# Patient Record
Sex: FEMALE | Race: WHITE | NOT HISPANIC OR LATINO
[De-identification: names, ages, dates, MRNs, and addresses within clinical notes are randomized per-mention and may not be internally consistent; named-entity substitution may affect disease eponyms.]

---

## 2017-02-03 ENCOUNTER — APPOINTMENT (OUTPATIENT)
Dept: CARDIOLOGY | Facility: CLINIC | Age: 82
End: 2017-02-03

## 2017-05-02 ENCOUNTER — APPOINTMENT (OUTPATIENT)
Dept: CARDIOLOGY | Facility: CLINIC | Age: 82
End: 2017-05-02

## 2017-05-03 ENCOUNTER — APPOINTMENT (OUTPATIENT)
Dept: CARDIOLOGY | Facility: CLINIC | Age: 82
End: 2017-05-03

## 2017-05-05 ENCOUNTER — APPOINTMENT (OUTPATIENT)
Dept: CARDIOLOGY | Facility: CLINIC | Age: 82
End: 2017-05-05

## 2017-05-19 ENCOUNTER — APPOINTMENT (OUTPATIENT)
Dept: CARDIOLOGY | Facility: CLINIC | Age: 82
End: 2017-05-19

## 2017-05-23 ENCOUNTER — APPOINTMENT (OUTPATIENT)
Dept: CARDIOLOGY | Facility: CLINIC | Age: 82
End: 2017-05-23

## 2017-06-06 ENCOUNTER — APPOINTMENT (OUTPATIENT)
Dept: CARDIOLOGY | Facility: CLINIC | Age: 82
End: 2017-06-06

## 2017-06-20 ENCOUNTER — APPOINTMENT (OUTPATIENT)
Dept: CARDIOLOGY | Facility: CLINIC | Age: 82
End: 2017-06-20

## 2017-06-30 ENCOUNTER — TRANSCRIPTION ENCOUNTER (OUTPATIENT)
Age: 82
End: 2017-06-30

## 2017-06-30 ENCOUNTER — APPOINTMENT (OUTPATIENT)
Dept: CARDIOLOGY | Facility: CLINIC | Age: 82
End: 2017-06-30

## 2017-09-05 ENCOUNTER — APPOINTMENT (OUTPATIENT)
Dept: CARDIOLOGY | Facility: CLINIC | Age: 82
End: 2017-09-05
Payer: MEDICARE

## 2017-09-05 PROCEDURE — 99214 OFFICE O/P EST MOD 30 MIN: CPT

## 2017-11-07 ENCOUNTER — APPOINTMENT (OUTPATIENT)
Age: 82
End: 2017-11-07
Payer: MEDICARE

## 2017-11-07 PROCEDURE — 99214 OFFICE O/P EST MOD 30 MIN: CPT

## 2017-12-28 ENCOUNTER — RECORD ABSTRACTING (OUTPATIENT)
Age: 82
End: 2017-12-28

## 2017-12-29 ENCOUNTER — MEDICATION RENEWAL (OUTPATIENT)
Age: 82
End: 2017-12-29

## 2017-12-29 RX ORDER — APIXABAN 5 MG/1
5 TABLET, FILM COATED ORAL
Qty: 180 | Refills: 1 | Status: ACTIVE | COMMUNITY
Start: 1900-01-01 | End: 1900-01-01

## 2018-01-02 ENCOUNTER — APPOINTMENT (OUTPATIENT)
Dept: CARDIOLOGY | Facility: CLINIC | Age: 83
End: 2018-01-02
Payer: MEDICARE

## 2018-01-02 VITALS
WEIGHT: 160 LBS | OXYGEN SATURATION: 95 % | HEART RATE: 82 BPM | DIASTOLIC BLOOD PRESSURE: 70 MMHG | BODY MASS INDEX: 29.44 KG/M2 | HEIGHT: 62 IN | SYSTOLIC BLOOD PRESSURE: 110 MMHG

## 2018-01-02 DIAGNOSIS — Z83.1 FAMILY HISTORY OF OTHER INFECTIOUS AND PARASITIC DISEASES: ICD-10-CM

## 2018-01-02 DIAGNOSIS — Z82.49 FAMILY HISTORY OF ISCHEMIC HEART DISEASE AND OTHER DISEASES OF THE CIRCULATORY SYSTEM: ICD-10-CM

## 2018-01-02 PROCEDURE — 99214 OFFICE O/P EST MOD 30 MIN: CPT

## 2018-01-02 RX ORDER — ZOLPIDEM TARTRATE 10 MG/1
10 TABLET, FILM COATED ORAL
Refills: 0 | Status: ACTIVE | COMMUNITY

## 2018-01-02 RX ORDER — ROSUVASTATIN CALCIUM 5 MG/1
5 TABLET, FILM COATED ORAL
Refills: 0 | Status: ACTIVE | COMMUNITY

## 2018-02-16 ENCOUNTER — MEDICATION RENEWAL (OUTPATIENT)
Age: 83
End: 2018-02-16

## 2018-02-21 RX ORDER — ALPRAZOLAM 0.5 MG/1
0.5 TABLET ORAL
Qty: 60 | Refills: 0 | Status: ACTIVE | COMMUNITY
Start: 2018-02-21 | End: 1900-01-01

## 2018-05-10 ENCOUNTER — APPOINTMENT (OUTPATIENT)
Dept: CARDIOLOGY | Facility: CLINIC | Age: 83
End: 2018-05-10
Payer: MEDICARE

## 2018-05-10 PROCEDURE — 93880 EXTRACRANIAL BILAT STUDY: CPT

## 2018-05-10 PROCEDURE — 93306 TTE W/DOPPLER COMPLETE: CPT

## 2018-05-21 ENCOUNTER — RECORD ABSTRACTING (OUTPATIENT)
Age: 83
End: 2018-05-21

## 2018-05-22 ENCOUNTER — APPOINTMENT (OUTPATIENT)
Dept: CARDIOLOGY | Facility: CLINIC | Age: 83
End: 2018-05-22
Payer: MEDICARE

## 2018-05-22 VITALS
OXYGEN SATURATION: 95 % | HEART RATE: 76 BPM | BODY MASS INDEX: 29.44 KG/M2 | SYSTOLIC BLOOD PRESSURE: 110 MMHG | WEIGHT: 160 LBS | HEIGHT: 62 IN | DIASTOLIC BLOOD PRESSURE: 70 MMHG

## 2018-05-22 PROCEDURE — 99214 OFFICE O/P EST MOD 30 MIN: CPT

## 2018-06-25 ENCOUNTER — OUTPATIENT (OUTPATIENT)
Dept: OUTPATIENT SERVICES | Facility: HOSPITAL | Age: 83
LOS: 1 days | End: 2018-06-25

## 2018-09-25 ENCOUNTER — OTHER (OUTPATIENT)
Age: 83
End: 2018-09-25

## 2018-10-11 ENCOUNTER — RECORD ABSTRACTING (OUTPATIENT)
Age: 83
End: 2018-10-11

## 2018-10-12 ENCOUNTER — RECORD ABSTRACTING (OUTPATIENT)
Age: 83
End: 2018-10-12

## 2018-10-16 ENCOUNTER — APPOINTMENT (OUTPATIENT)
Dept: CARDIOLOGY | Facility: CLINIC | Age: 83
End: 2018-10-16
Payer: MEDICARE

## 2018-10-16 VITALS
HEIGHT: 62 IN | DIASTOLIC BLOOD PRESSURE: 60 MMHG | WEIGHT: 160 LBS | BODY MASS INDEX: 29.44 KG/M2 | OXYGEN SATURATION: 97 % | SYSTOLIC BLOOD PRESSURE: 110 MMHG | HEART RATE: 85 BPM

## 2018-10-16 PROCEDURE — 99214 OFFICE O/P EST MOD 30 MIN: CPT

## 2018-10-16 PROCEDURE — 93000 ELECTROCARDIOGRAM COMPLETE: CPT

## 2019-03-12 ENCOUNTER — RECORD ABSTRACTING (OUTPATIENT)
Age: 84
End: 2019-03-12

## 2019-03-19 ENCOUNTER — APPOINTMENT (OUTPATIENT)
Dept: CARDIOLOGY | Facility: CLINIC | Age: 84
End: 2019-03-19
Payer: MEDICARE

## 2019-03-19 VITALS
HEART RATE: 80 BPM | HEIGHT: 62 IN | OXYGEN SATURATION: 95 % | BODY MASS INDEX: 30.36 KG/M2 | SYSTOLIC BLOOD PRESSURE: 120 MMHG | WEIGHT: 165 LBS | DIASTOLIC BLOOD PRESSURE: 70 MMHG

## 2019-03-19 PROCEDURE — 99214 OFFICE O/P EST MOD 30 MIN: CPT

## 2019-05-22 ENCOUNTER — APPOINTMENT (OUTPATIENT)
Dept: CARDIOLOGY | Facility: CLINIC | Age: 84
End: 2019-05-22
Payer: MEDICARE

## 2019-05-22 PROCEDURE — 93306 TTE W/DOPPLER COMPLETE: CPT

## 2019-05-22 PROCEDURE — 93880 EXTRACRANIAL BILAT STUDY: CPT

## 2019-05-24 ENCOUNTER — OUTPATIENT (OUTPATIENT)
Dept: OUTPATIENT SERVICES | Facility: HOSPITAL | Age: 84
LOS: 1 days | End: 2019-05-24

## 2019-06-06 ENCOUNTER — APPOINTMENT (OUTPATIENT)
Dept: CARDIOLOGY | Facility: CLINIC | Age: 84
End: 2019-06-06
Payer: MEDICARE

## 2019-06-06 VITALS
SYSTOLIC BLOOD PRESSURE: 140 MMHG | OXYGEN SATURATION: 96 % | HEART RATE: 78 BPM | WEIGHT: 160 LBS | DIASTOLIC BLOOD PRESSURE: 68 MMHG | HEIGHT: 62 IN | BODY MASS INDEX: 29.44 KG/M2

## 2019-06-06 DIAGNOSIS — F41.9 ANXIETY DISORDER, UNSPECIFIED: ICD-10-CM

## 2019-06-06 DIAGNOSIS — K21.9 GASTRO-ESOPHAGEAL REFLUX DISEASE W/OUT ESOPHAGITIS: ICD-10-CM

## 2019-06-06 PROCEDURE — 99214 OFFICE O/P EST MOD 30 MIN: CPT

## 2019-06-06 NOTE — PHYSICAL EXAM
[Normal Appearance] : normal appearance [Eyelids - No Xanthelasma] : the eyelids demonstrated no xanthelasmas [Normal Jugular Venous V Waves Present] : normal jugular venous V waves present [No Oral Pallor] : no oral pallor [Respiration, Rhythm And Depth] : normal respiratory rhythm and effort [Exaggerated Use Of Accessory Muscles For Inspiration] : no accessory muscle use [Heart Rate And Rhythm] : heart rate and rhythm were normal [Auscultation Breath Sounds / Voice Sounds] : lungs were clear to auscultation bilaterally [Heart Sounds] : normal S1 and S2 [Bowel Sounds] : normal bowel sounds [Abdomen Soft] : soft [Abdomen Tenderness] : non-tender [Petechial Hemorrhages (___cm)] : no petechial hemorrhages [Abnormal Walk] : normal gait [] : no rash [Oriented To Time, Place, And Person] : oriented to person, place, and time

## 2019-06-06 NOTE — HISTORY OF PRESENT ILLNESS
[FreeTextEntry1] : John is a pleasant 84-jorge-old female:\par \par She has no shortness of breath, minimal pedal edema towards end of the day.  She had TAVR.  \par She has nonobstructive carotid disease. \par She has history of atrial fibrillation.  We will make sure there is no decrease in heart rate, filiberto or tachyarrhythmias.  \par She does not want to have any exercise test \par The anxiousness has been more since the demise of the .  \par She will call us if she has any new symptoms following the testing.\par \par She had TAVR at [facility] on May 3, 2017, serial #[id number], model #[id number] aortic on June 7, 2017.  \par Her GERD symptoms are stable.  \par Blood pressure is stable.  \par She is compliant with her medications.  \par

## 2019-06-06 NOTE — DISCUSSION/SUMMARY
[FreeTextEntry1] : Discussed her blood pressure, labs, carotid ultrasound and echocardiogram results.\par \par Continue current medications, no change. Due to slight increase in her serum creatinine, Lasix had been held as per Dr. Ronquillo. Followup labs are pending with him.\par \par She has history of atrial fibrillation.  No recent palpitations, TIA, CVA or syncope. She is tolerating Eliquis\par \par She does not want to have any exercise test or any testings.  She is comfortable with conservative management.  She does not want aggressive measures if she gets sick.  \par \par \par

## 2019-06-06 NOTE — ASSESSMENT
[FreeTextEntry1] : Reviewed Today:\par - Carotid  ultrasound June 2019: Moderate atherosclerosis in the right bulb, no significant stenosis. Mild atherosclerosis in the left.\par - Echocardiogram May 2019: Normal left ventricular ejection fraction. Bioprosthetic aortic valve. Mean aortic valve gradient 23. PASP 35. Severe dilated left atrium.\par - Labs May 2019: Normal TSH. Serum creatinine 1.3. Normal LFT,  Potassium 4.4. LDL 42. A1c 7.1.

## 2019-09-24 ENCOUNTER — OUTPATIENT (OUTPATIENT)
Dept: OUTPATIENT SERVICES | Facility: HOSPITAL | Age: 84
LOS: 1 days | End: 2019-09-24

## 2019-10-23 ENCOUNTER — NON-APPOINTMENT (OUTPATIENT)
Age: 84
End: 2019-10-23

## 2019-10-23 ENCOUNTER — APPOINTMENT (OUTPATIENT)
Dept: CARDIOLOGY | Facility: CLINIC | Age: 84
End: 2019-10-23
Payer: MEDICARE

## 2019-10-23 VITALS
SYSTOLIC BLOOD PRESSURE: 122 MMHG | OXYGEN SATURATION: 95 % | WEIGHT: 160 LBS | HEIGHT: 62 IN | DIASTOLIC BLOOD PRESSURE: 70 MMHG | BODY MASS INDEX: 29.44 KG/M2 | HEART RATE: 82 BPM

## 2019-10-23 DIAGNOSIS — E11.9 TYPE 2 DIABETES MELLITUS W/OUT COMPLICATIONS: ICD-10-CM

## 2019-10-23 PROCEDURE — 99214 OFFICE O/P EST MOD 30 MIN: CPT

## 2019-10-23 PROCEDURE — 93000 ELECTROCARDIOGRAM COMPLETE: CPT

## 2019-10-23 RX ORDER — LEVOTHYROXINE SODIUM 25 UG/1
25 TABLET ORAL DAILY
Refills: 0 | Status: DISCONTINUED | COMMUNITY
End: 2019-10-23

## 2019-10-23 RX ORDER — LEVOTHYROXINE SODIUM 50 UG/1
50 TABLET ORAL DAILY
Refills: 0 | Status: ACTIVE | COMMUNITY

## 2019-10-23 NOTE — REASON FOR VISIT
[Follow-Up - Clinic] : a clinic follow-up of [Anticoagulation] : anticoagulation [Aortic Stenosis] : aortic stenosis [Atrial Fibrillation] : atrial fibrillation [Carotid Artery Stenosis] : carotid stenosis [Coronary Artery Disease] : coronary artery disease [Hyperlipidemia] : hyperlipidemia [Medication Management] : Medication management [Mitral Regurgitation] : mitral regurgitation [Prosthetic Valve] : a prosthetic valve

## 2019-10-23 NOTE — REVIEW OF SYSTEMS
[Eyeglasses] : currently wearing eyeglasses [Joint Pain] : joint pain [Negative] : Endocrine [see HPI] : see HPI

## 2019-10-23 NOTE — PHYSICAL EXAM
[Normal Appearance] : normal appearance [Eyelids - No Xanthelasma] : the eyelids demonstrated no xanthelasmas [No Oral Pallor] : no oral pallor [Normal Jugular Venous V Waves Present] : normal jugular venous V waves present [Respiration, Rhythm And Depth] : normal respiratory rhythm and effort [Exaggerated Use Of Accessory Muscles For Inspiration] : no accessory muscle use [Heart Rate And Rhythm] : heart rate and rhythm were normal [Auscultation Breath Sounds / Voice Sounds] : lungs were clear to auscultation bilaterally [Heart Sounds] : normal S1 and S2 [Bowel Sounds] : normal bowel sounds [Abdomen Soft] : soft [Abdomen Tenderness] : non-tender [Abnormal Walk] : normal gait [Petechial Hemorrhages (___cm)] : no petechial hemorrhages [] : no rash [Oriented To Time, Place, And Person] : oriented to person, place, and time

## 2019-10-24 NOTE — ASSESSMENT
[FreeTextEntry1] : Valvular heart disease status post TAVR, elevated gradient, mild paravalular, nl EF\par CAD\par Atherosclerosis\par Atrial fibrillation\par Dyslipidemia\par Diabetes \par Mild RI\par \par Presently asymptomatic.\par \par Monitor prosthetic gradient and paravalvular regurgitation.\par Monitor carotid disease.\par \par Continue carvedilol. Heart rate blood pressure well-controlled. \par Continue diuretic therapy. Low sodium diet. Monitor electrolytes and renal function. \par Continue statin therapy. \par Continue angiotensin receptor blocker\par Continue anticoagulation. Monitor hemoglobin and renal function. \par Monitor thyroid replacement. \par Diabetes control with primary physician. \par SBE prophylaxis\par Regular aerobic activity.

## 2019-10-24 NOTE — HISTORY OF PRESENT ILLNESS
[FreeTextEntry1] : VERONIKA WALDRON  is a 84 year old  F\par \par History of valvular heart disease status post TAVR, CAD, atherosclerosis, atrial fibrillation, dyslipidemia, diabetes, CRI \par There is no prior history of a clinical myocardial infarction, coronary revascularization. \par \par Long-standing history of heart murmur. \par Prior symptoms of edema and shortness of breath.\par \par  She is active at home and on the treadmill.\par There is no exertional chest pain, pressure or discomfort. \par There is no significant dyspnea on exertion or orthopnea. \par There are no symptomatic palpitations, lightheadedness, dizziness or syncope.\par \par Carotid Doppler May 2019. Mild to moderate atherosclerosis \par Echocardiogram May 2019 Normal left ventricular function. Mild mitral regurgitation bioprosthetic aortic valve mean gradient 23. Mild paravalvular aortic insufficiency left atrial enlargement  \par Cardiac catheterization 2017, normal left ventricular function and severe aortic stenosis, nonobstructive left circumflex disease\par EKG demonstrates atrial fibrillation with nonspecific ST changes. \par Last blood work. 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

## 2020-01-24 ENCOUNTER — OUTPATIENT (OUTPATIENT)
Dept: OUTPATIENT SERVICES | Facility: HOSPITAL | Age: 85
LOS: 1 days | End: 2020-01-24

## 2020-05-19 ENCOUNTER — OUTPATIENT (OUTPATIENT)
Dept: OUTPATIENT SERVICES | Facility: HOSPITAL | Age: 85
LOS: 1 days | End: 2020-05-19

## 2020-08-26 ENCOUNTER — APPOINTMENT (OUTPATIENT)
Dept: CARDIOLOGY | Facility: CLINIC | Age: 85
End: 2020-08-26

## 2020-08-27 ENCOUNTER — EMERGENCY (EMERGENCY)
Facility: HOSPITAL | Age: 85
LOS: 1 days | End: 2020-08-27
Admitting: EMERGENCY MEDICINE
Payer: MEDICARE

## 2020-08-27 PROCEDURE — 99284 EMERGENCY DEPT VISIT MOD MDM: CPT

## 2020-08-27 PROCEDURE — 74177 CT ABD & PELVIS W/CONTRAST: CPT | Mod: 26

## 2020-12-10 ENCOUNTER — OUTPATIENT (OUTPATIENT)
Dept: OUTPATIENT SERVICES | Facility: HOSPITAL | Age: 85
LOS: 1 days | End: 2020-12-10

## 2021-04-22 ENCOUNTER — OUTPATIENT (OUTPATIENT)
Dept: OUTPATIENT SERVICES | Facility: HOSPITAL | Age: 86
LOS: 1 days | End: 2021-04-22

## 2021-05-10 ENCOUNTER — RESULT CHARGE (OUTPATIENT)
Age: 86
End: 2021-05-10

## 2021-05-11 ENCOUNTER — APPOINTMENT (OUTPATIENT)
Dept: CARDIOLOGY | Facility: CLINIC | Age: 86
End: 2021-05-11
Payer: MEDICARE

## 2021-05-11 ENCOUNTER — NON-APPOINTMENT (OUTPATIENT)
Age: 86
End: 2021-05-11

## 2021-05-11 VITALS
TEMPERATURE: 98 F | WEIGHT: 158 LBS | HEART RATE: 70 BPM | DIASTOLIC BLOOD PRESSURE: 86 MMHG | OXYGEN SATURATION: 98 % | SYSTOLIC BLOOD PRESSURE: 160 MMHG | BODY MASS INDEX: 29.08 KG/M2 | HEIGHT: 62 IN

## 2021-05-11 PROCEDURE — 99215 OFFICE O/P EST HI 40 MIN: CPT

## 2021-05-11 PROCEDURE — 93000 ELECTROCARDIOGRAM COMPLETE: CPT

## 2021-05-11 RX ORDER — AMLODIPINE BESYLATE 2.5 MG/1
2.5 TABLET ORAL
Qty: 90 | Refills: 0 | Status: ACTIVE | COMMUNITY
Start: 2020-09-10

## 2021-05-11 RX ORDER — OXYCODONE AND ACETAMINOPHEN 5; 325 MG/1; MG/1
5-325 TABLET ORAL
Refills: 0 | Status: DISCONTINUED | COMMUNITY
End: 2021-05-11

## 2021-05-11 RX ORDER — LOSARTAN POTASSIUM 100 MG/1
100 TABLET, FILM COATED ORAL
Qty: 90 | Refills: 0 | Status: ACTIVE | COMMUNITY
Start: 2021-02-18

## 2021-05-11 RX ORDER — DICYCLOMINE HYDROCHLORIDE 20 MG/1
20 TABLET ORAL
Qty: 120 | Refills: 0 | Status: ACTIVE | COMMUNITY
Start: 2021-03-18

## 2021-05-11 RX ORDER — OMEPRAZOLE 20 MG/1
20 CAPSULE, DELAYED RELEASE ORAL
Qty: 90 | Refills: 0 | Status: ACTIVE | COMMUNITY
Start: 2020-08-24

## 2021-05-11 NOTE — HISTORY OF PRESENT ILLNESS
[FreeTextEntry1] : VERONIKA WALDRON  is a 86 year old  F here today for routine cardiovascular followup, last seen Oct 2019. \par \par History of valvular heart disease status post TAVR may 2017 at [place], nonobx CAD (cath '17 50% LCx), atherosclerosis, atrial fibrillation, dyslipidemia, diabetes, CRI \par There is no prior history of a clinical myocardial infarction, coronary revascularization, cerebrovascular events. \par \par Long-standing history of heart murmur. \par Prior symptoms of edema and shortness of breath which brought her into [facility] for [facility] procedure.\par Has remained very stable from cardiac standpoint since then. \par States she was in ED for stomach problems but was not admitted. \par \par She is active at home and on the treadmill.\par There is no exertional chest pain, pressure or discomfort. \par There is no significant dyspnea on exertion or orthopnea. \par There are no symptomatic palpitations, lightheadedness, dizziness or syncope.\par There is no abnormal bleeding on OAC. \par \par EKG today 5/11/21 AF with nonspecific ST-T wave abnormalities, no sign. change from prior.\par \par Labs 4/22/21 hgb 11, k 3.8, creat 1.1, LDL 48, HDL 46, A1c 6.6\par \par Carotid Doppler May 2019. Mild to moderate atherosclerosis \par \par Echocardiogram May 2019 Normal left ventricular function. Mild mitral regurgitation bioprosthetic aortic valve mean gradient 23. 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

## 2021-05-11 NOTE — PHYSICAL EXAM
[No Edema] : no edema [Normal] : alert and oriented, normal memory [de-identified] : BL carotid bruit [de-identified] : 2/5 ANDREIA parasternal border, irreg irreg

## 2021-05-11 NOTE — ASSESSMENT
[FreeTextEntry1] : VERONIKA WALDRON is a 86 year old F who presents today May 11, 2021 in routine cardiovascular followup. \par \par Valvular heart disease status post TAVR 2017, elevated gradient, mild paravalular, nl EF\par No evidence of HF on exam today. Recommend repeat echo for surveillance of aortic gradients. \par SBE prophylaxis\par \par CAD, nonobsx cath 2017\par No angina at present, limited functional status. \par Echo for LVEF and consider ischemic eval based on these results. \par \par HL\par Atherosclerosis, moderate\par Abd US for atherosclerotic disease and r/o AAA\par Obtain carotid ultrasound to evaluate for progressive carotid atherosclerosis or obstruction. \par Cont statin rx. Lipids are well controlled on low dose. \par \par Atrial fibrillation\par 3 day monitor to evaluate for rate control and r/o underling conduction disease given TAVR hx. \par Continue OAC, monitor hgb and renal fns. Reviewed bleeding precautions. \par \par Diabetes, managed closely with PCP. Last A1c 6.6\par Mild RI, stable. Cont to monitor. \par \par HTN, elevated on exam. Increase coreg to 12.5mg BID. Cont benicar 40 and lasix 20. Monitor renal fns and lects. Low salt diet and regular physical activity. \par \par Monitor thyroid replacement. \par \par F/U after testing to review results.\par Any questions and concerns were addressed and resolved. \par \par Sincerely,\par \par LIZBETH Yan\par Patients history, testing, and plan reviewed with supervising MD: Dr. Alexis Banerjee

## 2021-05-11 NOTE — ADDENDUM
[FreeTextEntry1] : Please note the patient was seen and examined with NP Lolis Cespedes\par I was physically present during the service of the patient and personally examined the patient. \jaylen I was directly involved in the management plan and recommendations of the care provided to the patient. \jaylen I personally reviewed the history and physical examination as documented by the NP above.\par 05/11/2021\par

## 2021-05-11 NOTE — REASON FOR VISIT
[Arrhythmia/ECG Abnorrmalities] : arrhythmia/ECG abnormalities [Structural Heart and Valve Disease] : structural heart and valve disease [Hyperlipidemia] : hyperlipidemia [Hypertension] : hypertension

## 2021-05-27 ENCOUNTER — APPOINTMENT (OUTPATIENT)
Dept: CARDIOLOGY | Facility: CLINIC | Age: 86
End: 2021-05-27
Payer: MEDICARE

## 2021-05-27 PROCEDURE — 93306 TTE W/DOPPLER COMPLETE: CPT

## 2021-05-27 PROCEDURE — 93979 VASCULAR STUDY: CPT

## 2021-05-27 PROCEDURE — 93880 EXTRACRANIAL BILAT STUDY: CPT

## 2021-06-01 ENCOUNTER — APPOINTMENT (OUTPATIENT)
Dept: CARDIOLOGY | Facility: CLINIC | Age: 86
End: 2021-06-01

## 2021-06-15 ENCOUNTER — NON-APPOINTMENT (OUTPATIENT)
Age: 86
End: 2021-06-15

## 2021-06-15 ENCOUNTER — APPOINTMENT (OUTPATIENT)
Dept: CARDIOLOGY | Facility: CLINIC | Age: 86
End: 2021-06-15
Payer: MEDICARE

## 2021-06-15 VITALS
HEART RATE: 78 BPM | BODY MASS INDEX: 27.6 KG/M2 | TEMPERATURE: 97.7 F | SYSTOLIC BLOOD PRESSURE: 110 MMHG | DIASTOLIC BLOOD PRESSURE: 80 MMHG | WEIGHT: 150 LBS | HEIGHT: 62 IN | OXYGEN SATURATION: 96 %

## 2021-06-15 DIAGNOSIS — E78.2 MIXED HYPERLIPIDEMIA: ICD-10-CM

## 2021-06-15 DIAGNOSIS — I65.29 OCCLUSION AND STENOSIS OF UNSPECIFIED CAROTID ARTERY: ICD-10-CM

## 2021-06-15 DIAGNOSIS — I10 ESSENTIAL (PRIMARY) HYPERTENSION: ICD-10-CM

## 2021-06-15 PROCEDURE — 99214 OFFICE O/P EST MOD 30 MIN: CPT

## 2021-06-15 RX ORDER — CARVEDILOL 6.25 MG/1
6.25 TABLET, FILM COATED ORAL
Qty: 180 | Refills: 0 | Status: DISCONTINUED | COMMUNITY
Start: 2020-08-24 | End: 2021-06-15

## 2021-06-15 RX ORDER — CARVEDILOL 12.5 MG/1
12.5 TABLET, FILM COATED ORAL
Refills: 0 | Status: DISCONTINUED | COMMUNITY
End: 2021-06-15

## 2021-06-15 RX ORDER — HYDROCODONE BITARTRATE AND ACETAMINOPHEN 5; 325 MG/1; MG/1
5-325 TABLET ORAL 3 TIMES DAILY
Refills: 0 | Status: ACTIVE | COMMUNITY
Start: 2021-04-15

## 2021-06-15 RX ORDER — CYCLOBENZAPRINE HYDROCHLORIDE 10 MG/1
10 TABLET, FILM COATED ORAL
Refills: 0 | Status: ACTIVE | COMMUNITY

## 2021-06-15 RX ORDER — CARVEDILOL 6.25 MG/1
6.25 TABLET, FILM COATED ORAL
Qty: 30 | Refills: 0 | Status: ACTIVE | COMMUNITY
Start: 2021-06-15

## 2021-06-15 RX ORDER — METFORMIN HYDROCHLORIDE 500 MG/1
500 TABLET, COATED ORAL
Refills: 0 | Status: DISCONTINUED | COMMUNITY
End: 2021-06-15

## 2021-06-15 RX ORDER — HYDROCODONE BITARTRATE AND ACETAMINOPHEN 5; 325 MG/1; MG/1
5-325 TABLET ORAL
Refills: 0 | Status: DISCONTINUED | COMMUNITY
End: 2021-06-15

## 2021-06-15 RX ORDER — FUROSEMIDE 20 MG/1
20 TABLET ORAL DAILY
Refills: 0 | Status: DISCONTINUED | COMMUNITY
End: 2021-06-15

## 2021-06-15 NOTE — CARDIOLOGY SUMMARY
[de-identified] : 5/11/21 AF with nonspecific ST-T wave abnormalities, no sign. change from prior. [de-identified] : declines [de-identified] : 5/27/21: Normal left ventricular function. Mild mitral regurgitation/stenosis,  bioprosthetic aortic valve mean gradient 22mmHg unchanged. Mild paravalvular aortic insufficiency, left atrial enlargement. [de-identified] : Cardiac catheterization 2017, normal left ventricular function and severe aortic stenosis, nonobstructive left circumflex disease [de-identified] : 5/27/21:\par Abd mild atherosclerosis of aortia, neg for AAA\par Carotid mild nonobstructive atherosclerosis  [de-identified] : Labs 4/22/21 hgb 11, k 3.8, creat 1.1, LDL 48, HDL 46, A1c 6.6

## 2021-06-15 NOTE — ASSESSMENT
[FreeTextEntry1] : VERONKIA WALDRON is a 86 year old F who presents today Enrique 15, 2021 here to review cardiovascular test results. Overall maintains good QOL and outlook. Asymptomatic from cardiac standpoint. \par \par Valvular heart disease status post TAVR 2017, elevated gradient, mild paravalular, nl EF\par No evidence of HF on exam today. Mean aortic gradients are unchanged from prior study.\par Continue surveillance monitoring.  \par SBE prophylaxis\par \par CAD, nonobsx cath 2017\par No angina at present, limited functional status. \par Echo shows normal LV systolic function. Pt prefers conservative management. Will defer stress testing. \par Cont preventative medical therapy including statin and diet/lifestyle changes. \par \par HL\par Atherosclerosis, moderate\par No AAA\par Mild abd/carotid atherosclerosis, nonobstructive\par Cont statin rx. Lipids are well controlled on low dose. \par \par Atrial fibrillation\par Recommended monitoring to r/o underling conduction disease given TAVR hx. \par Pt declines as she is asx from arrhythmia standpoint. No hx of underling AVB. \par Continue OAC and BB, monitor hgb and renal fns. Reviewed bleeding precautions. \par She'll call me right away if any dizziness or palps occur. \par \par Diabetes, managed closely with PCP. Last A1c 6.6\par Mild RI, stable. Cont to monitor. \par \par HTN, improved with dietary changes to reduce salt. Cont current dose of carvedilol, we'll call her to clarify. Cont benicar 40 and lasix 20. Monitor renal fns and lects. Low salt diet and regular physical activity. \par \par Monitor thyroid replacement. \par \par F/U 6 mo with Dr. Banerjee. \par Any questions and concerns were addressed and resolved. \par \par Sincerely,\par \par LIZBETH Yan\par Patients history, testing, and plan reviewed with supervising MD: Dr. Alexis Banerjee

## 2021-06-15 NOTE — ADDENDUM
[FreeTextEntry1] : Please note the patient was reviewed with NP Lolis Cespedes.\par I was physically present during the service of the patient.\par I was directly involved in the management plan and recommendations of the care provided to the patient. \par I personally reviewed the history and physical examination as documented by the NP above.\par Enrique 15 2021  2:00PM\par

## 2021-06-15 NOTE — HISTORY OF PRESENT ILLNESS
[FreeTextEntry1] : VERONIKA WALDRON  is a 86 year old  F here today here to review cardiovascular test results. \par \par History of valvular heart disease status post TAVR may 2017 at [place], nonobx CAD (cath '17 50% LCx), atherosclerosis, atrial fibrillation, dyslipidemia, diabetes, CRI \par There is no prior history of a clinical myocardial infarction, coronary revascularization, cerebrovascular events. \par \par Long-standing history of heart murmur. \par Prior symptoms of edema and shortness of breath which brought her into [facility] for [facility] procedure.\par Has remained very stable from cardiac standpoint since then. \par States she was in ED for stomach problems but was not admitted. \par \par She maintains good QOL, lives alone with help from friends. \par She is active at home and on the treadmill.\par There is no exertional chest pain, pressure or discomfort. \par There is no significant dyspnea on exertion or orthopnea. \par There are no symptomatic palpitations, lightheadedness, dizziness or syncope.\par There is no abnormal bleeding on OAC. \par \par I saw her in office last month and her BP was elevated. Today much improved. She realized she was eating too many cheetos. She did not make any med changes. 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

## 2021-06-15 NOTE — PHYSICAL EXAM
[No Edema] : no edema [Normal] : alert and oriented, normal memory [de-identified] : BL carotid bruit [de-identified] : 2/5 ANDREIA parasternal border, irreg irreg

## 2021-08-05 ENCOUNTER — OUTPATIENT (OUTPATIENT)
Dept: OUTPATIENT SERVICES | Facility: HOSPITAL | Age: 86
LOS: 1 days | End: 2021-08-05

## 2021-08-05 ENCOUNTER — INPATIENT (INPATIENT)
Facility: HOSPITAL | Age: 86
LOS: 1 days | Discharge: HOME | End: 2021-08-07
Admitting: INTERNAL MEDICINE
Payer: MEDICARE

## 2021-08-05 PROCEDURE — 93010 ELECTROCARDIOGRAM REPORT: CPT

## 2021-08-05 PROCEDURE — 71045 X-RAY EXAM CHEST 1 VIEW: CPT | Mod: 26

## 2021-08-05 PROCEDURE — 99285 EMERGENCY DEPT VISIT HI MDM: CPT | Mod: CS

## 2021-08-05 PROCEDURE — 70491 CT SOFT TISSUE NECK W/DYE: CPT | Mod: 26

## 2021-08-06 ENCOUNTER — OUTPATIENT (OUTPATIENT)
Dept: OUTPATIENT SERVICES | Facility: HOSPITAL | Age: 86
LOS: 1 days | End: 2021-08-06

## 2021-08-07 ENCOUNTER — OUTPATIENT (OUTPATIENT)
Dept: OUTPATIENT SERVICES | Facility: HOSPITAL | Age: 86
LOS: 1 days | End: 2021-08-07

## 2021-12-07 ENCOUNTER — APPOINTMENT (OUTPATIENT)
Dept: CARDIOLOGY | Facility: CLINIC | Age: 86
End: 2021-12-07
Payer: MEDICARE

## 2021-12-07 VITALS
SYSTOLIC BLOOD PRESSURE: 136 MMHG | HEIGHT: 62 IN | HEART RATE: 95 BPM | TEMPERATURE: 98 F | DIASTOLIC BLOOD PRESSURE: 70 MMHG | WEIGHT: 150 LBS | OXYGEN SATURATION: 96 % | BODY MASS INDEX: 27.6 KG/M2

## 2021-12-07 PROCEDURE — 99214 OFFICE O/P EST MOD 30 MIN: CPT

## 2021-12-11 NOTE — ASSESSMENT
[FreeTextEntry1] : Valvular heart disease status post TAVR 2017, elevated gradient, mild paravalular, nl EF\par f/u echo\par SBE prophylaxis\par \par CAD, nonobsx cath 2017\par No angina at present, limited functional status. \par Pt prefers conservative management. Will defer stress testing. \par Cont preventative medical therapy including statin and diet/lifestyle changes. \par \par HL\par Atherosclerosis, moderate\par No AAA\par Mild abd/carotid atherosclerosis, nonobstructive\par Continue statin therapy \par \par Atrial fibrillation\par Recommended monitoring to r/o underling conduction disease given TAVR hx. \par Pt declines as she is asx from arrhythmia standpoint. No hx of underling AVB. \par Continue OAC and BB, monitor hgb and renal fns. \par Reviewed bleeding precautions. \par \par HTN, improved \par Adjunctive lifestyle measures advised \par Low-sodium diet \par Monitor thyroid replacement. \par \par Declines further testing \par Requesting follow up blood work and echo \par \par Discussed red flag symptoms that would warrant immediate intervention. All patient questions and concerns have been addressed. Instructed to call the office if any new symptoms. \par \par Encounter documented by Alexandria Valadez on 12/07/2021 acting as a scribe for Dr. Alexis Banerjee MD [facility]

## 2022-01-14 ENCOUNTER — OUTPATIENT (OUTPATIENT)
Dept: OUTPATIENT SERVICES | Facility: HOSPITAL | Age: 87
LOS: 1 days | End: 2022-01-14

## 2022-01-27 DIAGNOSIS — E03.9 HYPOTHYROIDISM, UNSPECIFIED: ICD-10-CM

## 2022-05-05 ENCOUNTER — OUTPATIENT (OUTPATIENT)
Dept: OUTPATIENT SERVICES | Facility: HOSPITAL | Age: 87
LOS: 1 days | End: 2022-05-05

## 2022-05-05 DIAGNOSIS — R14.0 ABDOMINAL DISTENSION (GASEOUS): ICD-10-CM

## 2022-05-05 DIAGNOSIS — E11.9 TYPE 2 DIABETES MELLITUS WITHOUT COMPLICATIONS: ICD-10-CM

## 2022-05-05 DIAGNOSIS — E03.9 HYPOTHYROIDISM, UNSPECIFIED: ICD-10-CM

## 2022-06-22 ENCOUNTER — NON-APPOINTMENT (OUTPATIENT)
Age: 87
End: 2022-06-22

## 2022-06-22 ENCOUNTER — APPOINTMENT (OUTPATIENT)
Dept: CARDIOLOGY | Facility: CLINIC | Age: 87
End: 2022-06-22
Payer: MEDICARE

## 2022-06-22 VITALS
BODY MASS INDEX: 26.68 KG/M2 | HEART RATE: 65 BPM | WEIGHT: 145 LBS | DIASTOLIC BLOOD PRESSURE: 70 MMHG | TEMPERATURE: 98.4 F | SYSTOLIC BLOOD PRESSURE: 130 MMHG | HEIGHT: 62 IN | OXYGEN SATURATION: 96 %

## 2022-06-22 DIAGNOSIS — I25.10 ATHEROSCLEROTIC HEART DISEASE OF NATIVE CORONARY ARTERY W/OUT ANGINA PECTORIS: ICD-10-CM

## 2022-06-22 PROCEDURE — 99214 OFFICE O/P EST MOD 30 MIN: CPT

## 2022-06-22 PROCEDURE — 93000 ELECTROCARDIOGRAM COMPLETE: CPT

## 2022-06-22 RX ORDER — LANSOPRAZOLE 30 MG/1
30 CAPSULE, DELAYED RELEASE ORAL
Refills: 0 | Status: DISCONTINUED | COMMUNITY
End: 2022-06-22

## 2022-06-22 RX ORDER — METFORMIN HYDROCHLORIDE 500 MG/1
500 TABLET, COATED ORAL TWICE DAILY
Refills: 0 | Status: DISCONTINUED | COMMUNITY
Start: 2021-03-18 | End: 2022-06-22

## 2022-06-22 RX ORDER — OLMESARTAN MEDOXOMIL 40 MG/1
40 TABLET, FILM COATED ORAL DAILY
Refills: 0 | Status: DISCONTINUED | COMMUNITY
End: 2022-06-22

## 2022-06-22 NOTE — ASSESSMENT
[FreeTextEntry1] : Reviewed the importance of fall precautions.  \par Increase aerobic exercise.  \par Follow-up monitor.  \par Follow-up echocardiogram.  \par Stress testing has been deferred.\par \par Valvular heart disease status post TAVR 2017, elevated gradient, mild paravalular, nl EF\par f/u echo\par SBE prophylaxis\par \par CAD, nonobsx cath 2017\par No angina at present, limited functional status. \par Pt prefers conservative management. Will defer stress testing. \par Cont preventative medical therapy including statin and diet/lifestyle changes. \par \par HL\par Atherosclerosis, moderate\par No AAA\par Mild abd/carotid atherosclerosis, nonobstructive\par Continue statin therapy \par \par Atrial fibrillation\par Recommended monitoring to r/o underlying conduction disease given TAVR hx. \par Continue OAC and BB, monitor hgb and renal fns. \par Reviewed bleeding precautions. \par \par HTN, improved \par Adjunctive lifestyle measures advised \par Low-sodium diet \par Monitor thyroid replacement. \par \par Discussed red flag symptoms that would warrant immediate intervention. All patient questions and concerns have been addressed. Instructed to call the office if any new symptoms.

## 2022-06-22 NOTE — HISTORY OF PRESENT ILLNESS
[FreeTextEntry1] : VERONIKA WALDRON  is a 87 year old  F\par \par H/o VHD post TAVR may 2017 at [place], nonobx CAD (cath '17 50% LCx), atherosclerosis, atrial fibrillation, dyslipidemia, diabetes, CRI \par There is no prior history of a clinical myocardial infarction, coronary revascularization, cerebrovascular events. \par \par Long-standing history of heart murmur. \par Prior symptoms of edema and shortness of breath which brought her into [facility] for valve procedure.\par \par There is no exertional chest pain, pressure or discomfort. \par There is no significant dyspnea on exertion or orthopnea. \par There are no symptomatic palpitations, lightheadedness, dizziness or syncope.\par There is no abnormal bleeding on OAC. \par \par Today she is seen with her aide.  She reports having more difficulty with her balance. \par Less active. \par \par EKG demonstrates atrial fibrillation with poor R wave progression.  \par Blood work April 2021 potassium 3.8 CR 1.1 total cholesterol 118 LDL 48 TSH 2.0 \par Abdominal ultrasound may 2021 mild atherosclerosis no aneurysm \par Carotid Doppler May 2021 mild intimal thickening LCA and RCA \par Echo May 2021 mild MR mild TR normal LV function no pericardial effusion \par \par \par

## 2022-07-13 ENCOUNTER — APPOINTMENT (OUTPATIENT)
Dept: CARDIOLOGY | Facility: CLINIC | Age: 87
End: 2022-07-13

## 2022-07-26 ENCOUNTER — APPOINTMENT (OUTPATIENT)
Dept: CARDIOLOGY | Facility: CLINIC | Age: 87
End: 2022-07-26

## 2022-11-09 ENCOUNTER — APPOINTMENT (OUTPATIENT)
Dept: CARDIOLOGY | Facility: CLINIC | Age: 87
End: 2022-11-09

## 2022-11-09 VITALS
BODY MASS INDEX: 29.63 KG/M2 | SYSTOLIC BLOOD PRESSURE: 132 MMHG | HEIGHT: 62 IN | TEMPERATURE: 97.3 F | HEART RATE: 70 BPM | WEIGHT: 161 LBS | DIASTOLIC BLOOD PRESSURE: 74 MMHG | OXYGEN SATURATION: 97 %

## 2022-11-09 DIAGNOSIS — I35.0 NONRHEUMATIC AORTIC (VALVE) STENOSIS: ICD-10-CM

## 2022-11-09 DIAGNOSIS — Z95.2 PRESENCE OF PROSTHETIC HEART VALVE: ICD-10-CM

## 2022-11-09 DIAGNOSIS — I35.1 NONRHEUMATIC AORTIC (VALVE) INSUFFICIENCY: ICD-10-CM

## 2022-11-09 DIAGNOSIS — I48.91 UNSPECIFIED ATRIAL FIBRILLATION: ICD-10-CM

## 2022-11-09 PROCEDURE — 99214 OFFICE O/P EST MOD 30 MIN: CPT

## 2022-11-09 RX ORDER — GLIPIZIDE 5 MG/1
5 TABLET ORAL
Qty: 90 | Refills: 0 | Status: ACTIVE | COMMUNITY
Start: 2022-10-06

## 2022-12-17 NOTE — HISTORY OF PRESENT ILLNESS
[FreeTextEntry1] : VERONIKA WALDRON  is a 87 year old  F\par \par \par H/o VHD post TAVR may 2017 at [place], nonobx CAD (cath '17 50% LCx), atherosclerosis, atrial fibrillation, dyslipidemia, diabetes, CRI \par There is no prior history of a clinical myocardial infarction, coronary revascularization, cerebrovascular events. \par \par Long-standing history of heart murmur. \par Prior symptoms of edema and shortness of breath which brought her into  for valve procedure.\par \par There is no exertional chest pain, pressure or discomfort. \par There is no significant dyspnea on exertion or orthopnea. \par There are no symptomatic palpitations, lightheadedness, dizziness or syncope.\par There is no abnormal bleeding on OAC. \par \par She does not want any testing.  She reports she would not do anything if the testing is abnormal.  She reports prior dietary indiscretion.  She is now being more careful about dietary measures.  \par \par Hemoglobin 12.8, potassium 4.0, creatinine 1.2, A1c 7.7. \par \par EKG demonstrates atrial fibrillation with poor R wave progression. \par Blood work April 2021 potassium 3.8 CR 1.1 total cholesterol 118 LDL 48 TSH 2.0 \par Abdominal ultrasound may 2021 mild atherosclerosis no aneurysm \par Carotid Doppler May 2021 mild intimal thickening LCA and RCA \par Echo May 2021 mild MR mild TR normal LV function no pericardial effusion

## 2023-05-03 ENCOUNTER — APPOINTMENT (OUTPATIENT)
Dept: CARDIOLOGY | Facility: CLINIC | Age: 88
End: 2023-05-03

## 2023-07-22 NOTE — HISTORY OF PRESENT ILLNESS
[FreeTextEntry1] : VERONIKA WALDRON  is a 88 year old  F\par H/o VHD post TAVR may 2017 at [place], nonobx CAD (cath '17 50% LCx), atherosclerosis, atrial fibrillation, dyslipidemia, diabetes, CRI \par There is no prior history of a clinical myocardial infarction, coronary revascularization, cerebrovascular events. \par \par Long-standing history of heart murmur. \par Prior symptoms of edema and shortness of breath which brought her into [facility] for [facility] procedure.\par \par There is no exertional chest pain, pressure or discomfort. \par There is no significant dyspnea on exertion or orthopnea. \par There are no symptomatic palpitations, lightheadedness, dizziness or syncope.\par There is no abnormal bleeding on OAC. \par \par She does not want any testing.  She reports she would not do anything if the testing is abnormal.  She reports prior dietary indiscretion.  She is now being more careful about dietary measures.  \par \par Hemoglobin 12.8, potassium 4.0, creatinine 1.2, A1c 7.7. \par \par EKG demonstrates atrial fibrillation with poor R wave progression. \par Blood work April 2021 potassium 3.8 CR 1.1 total cholesterol 118 LDL 48 TSH 2.0 \par Abdominal ultrasound may 2021 mild atherosclerosis no aneurysm \par Carotid Doppler May 2021 mild intimal thickening LCA and RCA \par Echo May 2021 mild MR mild TR normal LV function no pericardial effusion \par Recent blood work has been reviewed.  \par Reviewed clinical red flags which would warrant further evaluation and management.\par Reviewed the importance of fall precautions. \par Increase aerobic exercise. \par \par Valvular heart disease status post TAVR 2017, elevated gradient, mild paravalular, nl EF\par declines echo\par SBE prophylaxis\par \par CAD, nonobsx cath 2017\par No angina at present, limited functional status. \par declines stress testing. \par Cont preventive medical therapy including statin and diet/lifestyle changes. \par \par HL\par Atherosclerosis, moderate\par No AAA\par Mild abd/carotid atherosclerosis, nonobstructive\par Continue statin therapy \par \par Atrial fibrillation\par Recommended monitoring to r/o underlying conduction disease given TAVR hx. declines\par Continue OAC and BB, monitor hgb and renal fns. \par Reviewed bleeding precautions. \par \par HTN, improved \par Adjunctive lifestyle measures advised \par Low-sodium diet \par Monitor thyroid replacement. \par \par Discussed red flag symptoms that would warrant immediate intervention. All patient questions and concerns have been addressed. Instructed to call the office if any new symptoms. \par

## 2023-07-25 ENCOUNTER — APPOINTMENT (OUTPATIENT)
Dept: CARDIOLOGY | Facility: CLINIC | Age: 88
End: 2023-07-25

## 2023-10-18 ENCOUNTER — APPOINTMENT (OUTPATIENT)
Dept: CARDIOLOGY | Facility: CLINIC | Age: 88
End: 2023-10-18

## 2024-03-14 NOTE — REASON FOR VISIT
[Arrhythmia/ECG Abnorrmalities] : arrhythmia/ECG abnormalities [Structural Heart and Valve Disease] : structural heart and valve disease Speaking Coherently

## 2024-03-20 NOTE — HISTORY OF PRESENT ILLNESS
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   Long-standing history of heart murmur.  Prior symptoms of edema and shortness of breath which brought her into  for valve procedure.  There is no exertional chest pain, pressure or discomfort.  There is no significant dyspnea on exertion or orthopnea.  There are no symptomatic palpitations, lightheadedness, dizziness or syncope. There is no abnormal bleeding on OAC.   She does not want any testing.  She reports she would not do anything if the testing is abnormal.  She reports prior dietary indiscretion.  She is now being more careful about dietary measures.    Hemoglobin 12.8, potassium 4.0, creatinine 1.2, A1c 7.7.   EKG demonstrates atrial fibrillation with poor R wave progression.  Blood work April 2021 potassium 3.8 CR 1.1 total cholesterol 118 LDL 48 TSH 2.0  Abdominal ultrasound may 2021 mild atherosclerosis no aneurysm  Carotid Doppler May 2021 mild intimal thickening LCA and RCA  Echo May 2021 mild MR mild TR normal LV function no pericardial effusion   Recent blood work has been reviewed.   Reviewed clinical red flags which would warrant further evaluation and management. Reviewed the importance of fall precautions.  Increase aerobic exercise.   Valvular heart disease status post TAVR 2017, elevated gradient, mild paravalular, nl EF declines echo SBE prophylaxis  CAD, nonobsx cath 2017 No angina at present, limited functional status.  declines stress testing.  Cont preventive medical therapy including statin and diet/lifestyle changes.   HL Atherosclerosis, moderate No AAA Mild abd/carotid atherosclerosis, nonobstructive Continue statin therapy   Atrial fibrillation Recommended monitoring to r/o underlying conduction disease given TAVR hx. declines Continue OAC and BB, monitor hgb and renal fns.  Reviewed bleeding precautions.   HTN, improved  Adjunctive lifestyle measures advised  Low-sodium diet  Monitor thyroid replacement.   Discussed red flag symptoms that would warrant immediate intervention. All patient questions and concerns have been addressed. Instructed to call the office if any new symptoms.

## 2024-03-27 ENCOUNTER — APPOINTMENT (OUTPATIENT)
Dept: CARDIOLOGY | Facility: CLINIC | Age: 89
End: 2024-03-27

## 2024-11-25 ENCOUNTER — RESULT REVIEW (OUTPATIENT)
Age: 89
End: 2024-11-25

## 2025-08-21 ENCOUNTER — RESULT REVIEW (OUTPATIENT)
Age: 89
End: 2025-08-21

## 2025-08-29 ENCOUNTER — APPOINTMENT (OUTPATIENT)
Dept: CARDIOLOGY | Facility: CLINIC | Age: 89
End: 2025-08-29